# Patient Record
Sex: MALE | Race: WHITE | NOT HISPANIC OR LATINO | Employment: OTHER | URBAN - METROPOLITAN AREA
[De-identification: names, ages, dates, MRNs, and addresses within clinical notes are randomized per-mention and may not be internally consistent; named-entity substitution may affect disease eponyms.]

---

## 2023-11-03 PROBLEM — E78.5 HYPERLIPIDEMIA: Status: ACTIVE | Noted: 2023-11-03

## 2023-11-03 PROBLEM — G89.29 CHRONIC PAIN OF RIGHT KNEE: Status: ACTIVE | Noted: 2023-11-03

## 2023-11-03 PROBLEM — N39.0 RECURRENT UTI: Status: ACTIVE | Noted: 2023-11-03

## 2023-11-03 PROBLEM — H52.203 MYOPIA OF BOTH EYES WITH ASTIGMATISM AND PRESBYOPIA: Status: ACTIVE | Noted: 2023-11-03

## 2023-11-03 PROBLEM — H52.4 MYOPIA OF BOTH EYES WITH ASTIGMATISM AND PRESBYOPIA: Status: ACTIVE | Noted: 2023-11-03

## 2023-11-03 PROBLEM — H35.363 DEGENERATIVE RETINAL DRUSEN OF BOTH EYES: Status: ACTIVE | Noted: 2023-11-03

## 2023-11-03 PROBLEM — S62.609A BROKEN FINGER: Status: ACTIVE | Noted: 2023-11-03

## 2023-11-03 PROBLEM — R39.9 UTI SYMPTOMS: Status: ACTIVE | Noted: 2023-11-03

## 2023-11-03 PROBLEM — R41.3 MEMORY LOSS: Status: ACTIVE | Noted: 2023-11-03

## 2023-11-03 PROBLEM — R31.1 BENIGN ESSENTIAL MICROSCOPIC HEMATURIA: Status: ACTIVE | Noted: 2023-11-03

## 2023-11-03 PROBLEM — N13.8 BPH WITH OBSTRUCTION/LOWER URINARY TRACT SYMPTOMS: Status: ACTIVE | Noted: 2023-11-03

## 2023-11-03 PROBLEM — N40.1 BPH WITH OBSTRUCTION/LOWER URINARY TRACT SYMPTOMS: Status: ACTIVE | Noted: 2023-11-03

## 2023-11-03 PROBLEM — R47.02 DYSPHASIA: Status: ACTIVE | Noted: 2023-11-03

## 2023-11-03 PROBLEM — M25.561 CHRONIC PAIN OF RIGHT KNEE: Status: ACTIVE | Noted: 2023-11-03

## 2023-11-03 PROBLEM — R21 RASH: Status: ACTIVE | Noted: 2023-11-03

## 2023-11-03 PROBLEM — M72.0 DUPUYTREN'S CONTRACTURE: Status: ACTIVE | Noted: 2023-11-03

## 2023-11-03 PROBLEM — H52.13 MYOPIA OF BOTH EYES: Status: ACTIVE | Noted: 2023-11-03

## 2023-11-03 PROBLEM — S62.637B OPEN DISPLACED FRACTURE OF DISTAL PHALANX OF LEFT LITTLE FINGER: Status: ACTIVE | Noted: 2023-11-03

## 2023-11-03 PROBLEM — R31.9 HEMATURIA: Status: ACTIVE | Noted: 2023-11-03

## 2023-11-03 PROBLEM — H25.813 COMBINED FORM OF SENILE CATARACT OF BOTH EYES: Status: ACTIVE | Noted: 2023-11-03

## 2023-11-03 RX ORDER — ASPIRIN 325 MG
1 TABLET ORAL 2 TIMES DAILY
COMMUNITY
Start: 2014-01-03

## 2023-11-03 RX ORDER — TAMSULOSIN HYDROCHLORIDE 0.4 MG/1
0.4 CAPSULE ORAL NIGHTLY
COMMUNITY
End: 2023-11-06 | Stop reason: SDUPTHER

## 2023-11-03 RX ORDER — CLOBETASOL PROPIONATE 0.5 MG/G
OINTMENT TOPICAL 2 TIMES DAILY
COMMUNITY
Start: 2021-12-31

## 2023-11-03 RX ORDER — CLOTRIMAZOLE 1 %
CREAM (GRAM) TOPICAL 2 TIMES DAILY
COMMUNITY

## 2023-11-03 RX ORDER — ASCORBIC ACID 250 MG
250 TABLET,CHEWABLE ORAL
COMMUNITY

## 2023-11-03 RX ORDER — SULFAMETHOXAZOLE AND TRIMETHOPRIM 800; 160 MG/1; MG/1
1 TABLET ORAL EVERY 12 HOURS
COMMUNITY
Start: 2022-08-24

## 2023-11-03 RX ORDER — ASCORBIC ACID 500 MG
1 TABLET ORAL DAILY
COMMUNITY

## 2023-11-06 ENCOUNTER — OFFICE VISIT (OUTPATIENT)
Dept: UROLOGY | Facility: CLINIC | Age: 83
End: 2023-11-06
Payer: MEDICARE

## 2023-11-06 DIAGNOSIS — N40.1 BPH WITH OBSTRUCTION/LOWER URINARY TRACT SYMPTOMS: Primary | ICD-10-CM

## 2023-11-06 DIAGNOSIS — N39.0 RECURRENT UTI: ICD-10-CM

## 2023-11-06 DIAGNOSIS — N13.8 BPH WITH OBSTRUCTION/LOWER URINARY TRACT SYMPTOMS: Primary | ICD-10-CM

## 2023-11-06 LAB
POC APPEARANCE, URINE: CLEAR
POC BILIRUBIN, URINE: NEGATIVE
POC BLOOD, URINE: NEGATIVE
POC COLOR, URINE: YELLOW
POC GLUCOSE, URINE: NEGATIVE MG/DL
POC KETONES, URINE: ABNORMAL MG/DL
POC LEUKOCYTES, URINE: NEGATIVE
POC NITRITE,URINE: NEGATIVE
POC PH, URINE: 6 PH
POC PROTEIN, URINE: NEGATIVE MG/DL
POC SPECIFIC GRAVITY, URINE: 1.02
POC UROBILINOGEN, URINE: 0.2 EU/DL

## 2023-11-06 PROCEDURE — 81003 URINALYSIS AUTO W/O SCOPE: CPT | Performed by: UROLOGY

## 2023-11-06 PROCEDURE — 99214 OFFICE O/P EST MOD 30 MIN: CPT | Performed by: UROLOGY

## 2023-11-06 PROCEDURE — 51798 US URINE CAPACITY MEASURE: CPT | Performed by: UROLOGY

## 2023-11-06 PROCEDURE — 51741 ELECTRO-UROFLOWMETRY FIRST: CPT | Performed by: UROLOGY

## 2023-11-06 RX ORDER — TAMSULOSIN HYDROCHLORIDE 0.4 MG/1
0.4 CAPSULE ORAL NIGHTLY
Qty: 90 CAPSULE | Refills: 3 | Status: SHIPPED | OUTPATIENT
Start: 2023-11-06 | End: 2023-11-30 | Stop reason: SDUPTHER

## 2023-11-06 NOTE — PROGRESS NOTES
Subjective   John Green is a 83 y.o. male male with history of BPH on Tamsulosin and recurrent UTIs. He reports his LUTs have improved. He reports no recurrence of UTIs since starting Tamsulosin. He has occasional weak stream and incomplete bladder emptying which are not bothersome.  Denies any gross hematuria, flank pain, dysuria, fevers or chills.       Past Medical History:   Diagnosis Date    Chronic obstructive pulmonary disease, unspecified (CMS/HCC)     Chronic obstructive pulmonary disease    Diverticulosis of intestine, part unspecified, without perforation or abscess without bleeding     Diverticulosis    Other conditions influencing health status     Reported Prostate Antigen Blood Test    Other conditions influencing health status     Benign Polyps Of The Large Intestine    Personal history of other diseases of urinary system     History of bladder problems     No past surgical history on file.  Family History   Problem Relation Name Age of Onset    Heart failure Mother      Breast cancer Sister      Other (motor vehicle accident) Sister      Prostate cancer Brother      Colon cancer Maternal Grandmother      Colon cancer Paternal Grandfather          hx of lung cancer    Lung cancer Other       Current Outpatient Medications   Medication Sig Dispense Refill    ascorbic acid (Vitamin C) 250 MG chewable tablet Chew 1 tablet (250 mg).      ascorbic acid (Vitamin C) 500 mg tablet Take 1 tablet (500 mg) by mouth once daily.      aspirin 325 mg tablet Take 1 tablet (325 mg) by mouth 2 times a day.      clobetasol (Temovate) 0.05 % ointment Apply topically 2 times a day.      clotrimazole (Lotrimin) 1 % cream Apply topically 2 times a day.      docosahexaenoic acid/epa (FISH OIL ORAL) Take by mouth once daily.      sulfamethoxazole-trimethoprim (Bactrim DS) 800-160 mg tablet Take 1 tablet by mouth every 12 hours.      tamsulosin (Flomax) 0.4 mg 24 hr capsule Take 1 capsule (0.4 mg) by mouth once daily at  "bedtime. 90 capsule 3     No current facility-administered medications for this visit.     Allergies   Allergen Reactions    Penicillins Other     Social History     Socioeconomic History    Marital status: Single     Spouse name: Not on file    Number of children: Not on file    Years of education: Not on file    Highest education level: Not on file   Occupational History    Not on file   Tobacco Use    Smoking status: Not on file    Smokeless tobacco: Not on file   Substance and Sexual Activity    Alcohol use: Not on file    Drug use: Not on file    Sexual activity: Not on file   Other Topics Concern    Not on file   Social History Narrative    Not on file     Social Determinants of Health     Financial Resource Strain: Not on file   Food Insecurity: Not on file   Transportation Needs: Not on file   Physical Activity: Not on file   Stress: Not on file   Social Connections: Not on file   Intimate Partner Violence: Not on file   Housing Stability: Not on file       Review of Systems  Pertinent items are noted in HPI.    Objective     Lab Review  No results found for: \"WBC\", \"RBC\", \"HGB\", \"HCT\", \"PLT\"   Lab Results   Component Value Date    BUN 26 (H) 05/06/2020    CREATININE 0.96 05/06/2020      No results found for: \"PSA\"  Urine analysis shows negative  PVR is 55mL  Urolfow study demonstrated voided volume of 85 and maximal flow rate of 4.6 ml/s.   IPSS 13 and 2    Assessment/Plan   Diagnoses and all orders for this visit:  BPH with obstruction/lower urinary tract symptoms  -     POCT UA Automated manually resulted  -     Measure post void residual  -     Urine flow measurement  -     tamsulosin (Flomax) 0.4 mg 24 hr capsule; Take 1 capsule (0.4 mg) by mouth once daily at bedtime.  Recurrent UTI      BPH with LUTS     Patients urinary symptoms are well controlled on Tamsulosin.     We will refill Tamsulosin and follow up in 1 year.     2. Recurrent UTI    No recurrence since last year. Will continue to monitor. "       All questions were answered to the patient's satisfaction. Patient agrees with the plan and wishes to proceed. Follow-up will be scheduled appropriately.   Scribed for Dr. Amor by Shawnee Irizarry. I , Dr Amor, have personally reviewed and agreed with the information entered by the Virtual Scribe.     Shawnee Irizarry

## 2023-11-30 DIAGNOSIS — N40.1 BPH WITH OBSTRUCTION/LOWER URINARY TRACT SYMPTOMS: ICD-10-CM

## 2023-11-30 DIAGNOSIS — N13.8 BPH WITH OBSTRUCTION/LOWER URINARY TRACT SYMPTOMS: ICD-10-CM

## 2023-11-30 RX ORDER — TAMSULOSIN HYDROCHLORIDE 0.4 MG/1
0.4 CAPSULE ORAL NIGHTLY
Qty: 90 CAPSULE | Refills: 3 | Status: SHIPPED | OUTPATIENT
Start: 2023-11-30 | End: 2024-11-29

## 2023-11-30 NOTE — TELEPHONE ENCOUNTER
Refill was sent during his last appointment on 11/6/23. Patient states pharmacy does not have it.

## 2024-03-26 NOTE — PROGRESS NOTES
History of Present Illness    John Green is a 83 y.o. male who is seen self-referred because he has had a feeling in his throat on the right side.  He has the impression of a lump.  He denies any classic symptoms of gastroesophageal reflux disease.  He sometimes will have some voice changes.    Past Medical History    His past medical history is limited to prostate issues.  His medications are documented in the chart.  He does have allergy to penicillin.  He does not remember what happened.  He does not smoke.  He normally has 2 or 3 drinks a day.  He is here alone today.    Physical Exam    The patient is alert and oriented. Examination of the external ears, ear canals, and eardrums, is within normal limits. Examination of the anterior and external nose is negative. Examination of the oral cavity and oropharynx is normal. There is no evidence of any mucosal lesions. There is good mobility of the tongue and palate. There is good mandibular excursion. Palpation of the parotid, neck, and thyroid field is negative except for a palpable mass in the left neck at around level 3.  It is fairly soft.  It measures about 3 cm or so.    A flexible laryngoscopy was carried out. Under topical Xylocaine and Diogenes-Synephrine the scope was introduced through the nostril. The nasopharynx, base of tongue, hypopharynx, and larynx are visualized.  The vocal cords are normally mobile. There is no pooling of secretions in the piriform sinuses. There is no evidence of any mucosal lesions.    Assessment and Plan    Throat sensation which may well be due to gastroesophageal reflux disease.  I gave him a list of instructions for dietary modifications.    Left neck mass which is fairly soft but at the same time should not be there.  A CT scan of the neck will be obtained.    I will see him after the CT scan.

## 2024-03-27 ENCOUNTER — LAB (OUTPATIENT)
Dept: LAB | Facility: LAB | Age: 84
End: 2024-03-27
Payer: MEDICARE

## 2024-03-27 ENCOUNTER — OFFICE VISIT (OUTPATIENT)
Dept: OTOLARYNGOLOGY | Facility: CLINIC | Age: 84
End: 2024-03-27
Payer: MEDICARE

## 2024-03-27 VITALS — TEMPERATURE: 93 F | BODY MASS INDEX: 23.5 KG/M2 | WEIGHT: 163.8 LBS

## 2024-03-27 DIAGNOSIS — K21.9 REFLUX LARYNGITIS: Primary | ICD-10-CM

## 2024-03-27 DIAGNOSIS — R22.1 MASS OF LEFT SIDE OF NECK: ICD-10-CM

## 2024-03-27 DIAGNOSIS — J04.0 REFLUX LARYNGITIS: Primary | ICD-10-CM

## 2024-03-27 LAB
CREAT SERPL-MCNC: 1.08 MG/DL (ref 0.5–1.3)
EGFRCR SERPLBLD CKD-EPI 2021: 68 ML/MIN/1.73M*2

## 2024-03-27 PROCEDURE — 1159F MED LIST DOCD IN RCRD: CPT | Performed by: OTOLARYNGOLOGY

## 2024-03-27 PROCEDURE — 99203 OFFICE O/P NEW LOW 30 MIN: CPT | Performed by: OTOLARYNGOLOGY

## 2024-03-27 PROCEDURE — 31575 DIAGNOSTIC LARYNGOSCOPY: CPT | Performed by: OTOLARYNGOLOGY

## 2024-03-27 PROCEDURE — 82565 ASSAY OF CREATININE: CPT

## 2024-03-27 PROCEDURE — 1160F RVW MEDS BY RX/DR IN RCRD: CPT | Performed by: OTOLARYNGOLOGY

## 2024-03-27 PROCEDURE — 36415 COLL VENOUS BLD VENIPUNCTURE: CPT

## 2024-03-27 ASSESSMENT — ENCOUNTER SYMPTOMS: DEPRESSION: 0

## 2024-04-02 NOTE — PROGRESS NOTES
History of Present Illness    John Green was seen in March 2024 self-referred because he has had a feeling in his throat on the right side.  He has the impression of a lump.  He denies any classic symptoms of gastroesophageal reflux disease.  He sometimes will have some voice changes.  I felt a mass in his left mid neck and sent him for CT scanning which was done in April 2024.  I personally reviewed that scan and cannot appreciate any masses.  He does have some rotation of his larynx which may be the cause of the palpable mass.    Physical Exam    Palpation of the parotid, neck, and thyroid field is negative except for this asymmetry with the left side being more prominent at the laryngeal level.    Assessment and Plan    Throat sensation which may well be due to gastroesophageal reflux disease.  I gave him a list of instructions for dietary modifications.    Left neck asymmetry which seems to be related to some rotation of the larynx.  I cannot appreciate any worrisome lesions on the scan.  If the radiologist disagrees with me I will call the patient back.    I will see him in 3 months.

## 2024-04-03 ENCOUNTER — HOSPITAL ENCOUNTER (OUTPATIENT)
Dept: RADIOLOGY | Facility: CLINIC | Age: 84
Discharge: HOME | End: 2024-04-03
Payer: MEDICARE

## 2024-04-03 ENCOUNTER — OFFICE VISIT (OUTPATIENT)
Dept: OTOLARYNGOLOGY | Facility: CLINIC | Age: 84
End: 2024-04-03
Payer: MEDICARE

## 2024-04-03 VITALS — HEIGHT: 70 IN | BODY MASS INDEX: 23.05 KG/M2 | WEIGHT: 161 LBS | TEMPERATURE: 97.5 F

## 2024-04-03 DIAGNOSIS — R22.1 MASS OF LEFT SIDE OF NECK: Primary | ICD-10-CM

## 2024-04-03 DIAGNOSIS — R22.1 MASS OF LEFT SIDE OF NECK: ICD-10-CM

## 2024-04-03 PROCEDURE — 1160F RVW MEDS BY RX/DR IN RCRD: CPT | Performed by: OTOLARYNGOLOGY

## 2024-04-03 PROCEDURE — 1159F MED LIST DOCD IN RCRD: CPT | Performed by: OTOLARYNGOLOGY

## 2024-04-03 PROCEDURE — 70491 CT SOFT TISSUE NECK W/DYE: CPT | Performed by: RADIOLOGY

## 2024-04-03 PROCEDURE — 1036F TOBACCO NON-USER: CPT | Performed by: OTOLARYNGOLOGY

## 2024-04-03 PROCEDURE — 2550000001 HC RX 255 CONTRASTS: Performed by: OTOLARYNGOLOGY

## 2024-04-03 PROCEDURE — 99213 OFFICE O/P EST LOW 20 MIN: CPT | Performed by: OTOLARYNGOLOGY

## 2024-04-03 PROCEDURE — 70491 CT SOFT TISSUE NECK W/DYE: CPT

## 2024-04-03 RX ADMIN — IOHEXOL 75 ML: 350 INJECTION, SOLUTION INTRAVENOUS at 13:20

## 2024-04-03 ASSESSMENT — PATIENT HEALTH QUESTIONNAIRE - PHQ9
2. FEELING DOWN, DEPRESSED OR HOPELESS: NOT AT ALL
1. LITTLE INTEREST OR PLEASURE IN DOING THINGS: NOT AT ALL
SUM OF ALL RESPONSES TO PHQ9 QUESTIONS 1 AND 2: 0

## 2024-07-01 NOTE — PROGRESS NOTES
History of Present Illness    John Green was seen in March 2024 self-referred because he has had a feeling in his throat on the right side.  He has the impression of a lump.  He denies any classic symptoms of gastroesophageal reflux disease.  He sometimes will have some voice changes.  I felt a mass in his left mid neck and sent him for CT scanning which was done in April 2024.  I personally reviewed that scan and cannot appreciate any masses.  His CT scan was also negative.  I felt that his symptoms were related to reflux but it does not seem that the patient changed his diet in any way.  He continues with the same symptoms.    Physical Exam    Palpation of the parotid, neck, and thyroid field is negative except for this asymmetry with the left side being more prominent at the laryngeal level.    A flexible laryngoscopy was carried out. Under topical Xylocaine and Diogenes-Synephrine the scope was introduced through the nostril. The nasopharynx, base of tongue, hypopharynx, and larynx are visualized.  The vocal cords are normally mobile. There is no pooling of secretions in the piriform sinuses. There is no evidence of any mucosal lesions.    Assessment and Plan    Throat sensation which may well be due to gastroesophageal reflux disease.  I I reinforced the need for dietary modifications.  I also will put him on omeprazole.  He was instructed to take it half an hour before breakfast.    I will see him in 3 months.

## 2024-07-03 ENCOUNTER — APPOINTMENT (OUTPATIENT)
Dept: OTOLARYNGOLOGY | Facility: CLINIC | Age: 84
End: 2024-07-03
Payer: MEDICARE

## 2024-07-03 VITALS — HEIGHT: 70 IN | WEIGHT: 163 LBS | BODY MASS INDEX: 23.34 KG/M2 | TEMPERATURE: 98 F

## 2024-07-03 DIAGNOSIS — J04.0 REFLUX LARYNGITIS: Primary | ICD-10-CM

## 2024-07-03 DIAGNOSIS — K21.9 REFLUX LARYNGITIS: Primary | ICD-10-CM

## 2024-07-03 PROCEDURE — 1159F MED LIST DOCD IN RCRD: CPT | Performed by: OTOLARYNGOLOGY

## 2024-07-03 PROCEDURE — 31575 DIAGNOSTIC LARYNGOSCOPY: CPT | Performed by: OTOLARYNGOLOGY

## 2024-07-03 PROCEDURE — 1160F RVW MEDS BY RX/DR IN RCRD: CPT | Performed by: OTOLARYNGOLOGY

## 2024-07-03 PROCEDURE — 99213 OFFICE O/P EST LOW 20 MIN: CPT | Performed by: OTOLARYNGOLOGY

## 2024-07-03 PROCEDURE — 1036F TOBACCO NON-USER: CPT | Performed by: OTOLARYNGOLOGY

## 2024-07-03 ASSESSMENT — ENCOUNTER SYMPTOMS
LOSS OF SENSATION IN FEET: 0
OCCASIONAL FEELINGS OF UNSTEADINESS: 0
DEPRESSION: 0

## 2024-07-03 ASSESSMENT — PATIENT HEALTH QUESTIONNAIRE - PHQ9: 1. LITTLE INTEREST OR PLEASURE IN DOING THINGS: NOT AT ALL

## 2024-07-12 ENCOUNTER — APPOINTMENT (OUTPATIENT)
Dept: OPHTHALMOLOGY | Facility: CLINIC | Age: 84
End: 2024-07-12
Payer: MEDICARE

## 2024-08-26 ENCOUNTER — APPOINTMENT (OUTPATIENT)
Dept: OPHTHALMOLOGY | Facility: CLINIC | Age: 84
End: 2024-08-26
Payer: MEDICARE

## 2024-10-15 NOTE — PROGRESS NOTES
History of Present Illness    John Green was seen in March 2024 self-referred because he has had a feeling in his throat on the right side.  He has the impression of a lump.  He denies any classic symptoms of gastroesophageal reflux disease.  He sometimes will have some voice changes.  I felt a mass in his left mid neck and sent him for CT scanning which was done in April 2024.  I personally reviewed that scan and cannot appreciate any masses.  His CT scan was also negative.  I felt that his symptoms were related to reflux but it does not seem that the patient changed his diet in any way.  He continues with the same symptoms.    Physical Exam    Palpation of the parotid, neck, and thyroid field is negative except for this asymmetry with the left side being more prominent at the laryngeal level.    A flexible laryngoscopy was carried out. Under topical Xylocaine and Diogenes-Synephrine the scope was introduced through the nostril. The nasopharynx, base of tongue, hypopharynx, and larynx are visualized.  The vocal cords are normally mobile. There is no pooling of secretions in the piriform sinuses.  It is somewhat irritated.  There is no evidence of any mucosal lesions.    Assessment and Plan    Throat sensation which may well be due to gastroesophageal reflux disease.  I I reinforced the need for dietary modifications.  I also will put him on omeprazole.  He was instructed to take it half an hour before breakfast.    Left persistent neck mass.  There is really nothing that was found on scanning.    I will see him in 3 months.

## 2024-10-16 ENCOUNTER — APPOINTMENT (OUTPATIENT)
Dept: OTOLARYNGOLOGY | Facility: CLINIC | Age: 84
End: 2024-10-16
Payer: MEDICARE

## 2024-10-16 VITALS — TEMPERATURE: 97.5 F | HEIGHT: 69 IN | WEIGHT: 162.1 LBS | BODY MASS INDEX: 24.01 KG/M2

## 2024-10-16 DIAGNOSIS — J04.0 REFLUX LARYNGITIS: Primary | ICD-10-CM

## 2024-10-16 DIAGNOSIS — R22.1 MASS OF LEFT SIDE OF NECK: ICD-10-CM

## 2024-10-16 DIAGNOSIS — K21.9 REFLUX LARYNGITIS: Primary | ICD-10-CM

## 2024-10-16 RX ORDER — OMEPRAZOLE 40 MG/1
40 CAPSULE, DELAYED RELEASE ORAL
Qty: 90 CAPSULE | Refills: 1 | Status: SHIPPED | OUTPATIENT
Start: 2024-10-16 | End: 2025-04-14

## 2024-10-16 ASSESSMENT — PATIENT HEALTH QUESTIONNAIRE - PHQ9
SUM OF ALL RESPONSES TO PHQ9 QUESTIONS 1 AND 2: 0
1. LITTLE INTEREST OR PLEASURE IN DOING THINGS: NOT AT ALL
2. FEELING DOWN, DEPRESSED OR HOPELESS: NOT AT ALL

## 2024-10-16 ASSESSMENT — PAIN SCALES - GENERAL: PAINLEVEL_OUTOF10: 0-NO PAIN

## 2024-11-06 ENCOUNTER — APPOINTMENT (OUTPATIENT)
Dept: UROLOGY | Facility: CLINIC | Age: 84
End: 2024-11-06
Payer: MEDICARE

## 2024-11-06 VITALS — TEMPERATURE: 97 F | HEIGHT: 69 IN | BODY MASS INDEX: 23.7 KG/M2 | WEIGHT: 160 LBS

## 2024-11-06 DIAGNOSIS — N13.8 BPH WITH OBSTRUCTION/LOWER URINARY TRACT SYMPTOMS: Primary | ICD-10-CM

## 2024-11-06 DIAGNOSIS — N40.1 BPH WITH OBSTRUCTION/LOWER URINARY TRACT SYMPTOMS: Primary | ICD-10-CM

## 2024-11-06 PROCEDURE — 99213 OFFICE O/P EST LOW 20 MIN: CPT | Performed by: UROLOGY

## 2024-11-06 PROCEDURE — 51741 ELECTRO-UROFLOWMETRY FIRST: CPT | Performed by: UROLOGY

## 2024-11-06 PROCEDURE — 1126F AMNT PAIN NOTED NONE PRSNT: CPT | Performed by: UROLOGY

## 2024-11-06 PROCEDURE — 51798 US URINE CAPACITY MEASURE: CPT | Performed by: UROLOGY

## 2024-11-06 PROCEDURE — 1159F MED LIST DOCD IN RCRD: CPT | Performed by: UROLOGY

## 2024-11-06 RX ORDER — TAMSULOSIN HYDROCHLORIDE 0.4 MG/1
0.4 CAPSULE ORAL NIGHTLY
Qty: 90 CAPSULE | Refills: 3 | Status: SHIPPED | OUTPATIENT
Start: 2024-11-06 | End: 2025-11-06

## 2024-11-06 ASSESSMENT — PAIN SCALES - GENERAL: PAINLEVEL_OUTOF10: 0-NO PAIN

## 2024-11-06 NOTE — PROGRESS NOTES
Subjective   John Green is a 84 y.o. male with history of BPH on Tamsulosin and recurrent UTIs; presenting today for annual follow up visit.     No recent UTIS.He reports his LUTs have improved. He reports no recurrence of UTIs since starting Tamsulosin. He has occasional weak stream and incomplete bladder emptying which are not bothersome.  Denies any gross hematuria, flank pain, dysuria, fevers or chills.       PVR 76  Uroflow study demonstrated voided volume of 79 and maximal flow rate of 3 ml/s.       International Prostate Symptom Score (I-PSS)   I-PSS Total Score: 14  Quality of Life Score: 2  Past Medical History:   Diagnosis Date    Chronic obstructive pulmonary disease, unspecified     Chronic obstructive pulmonary disease    Diverticulosis of intestine, part unspecified, without perforation or abscess without bleeding     Diverticulosis    Other conditions influencing health status     Reported Prostate Antigen Blood Test    Other conditions influencing health status     Benign Polyps Of The Large Intestine    Personal history of other diseases of urinary system     History of bladder problems     No past surgical history on file.  Family History   Problem Relation Name Age of Onset    Heart failure Mother      Breast cancer Sister      Other (motor vehicle accident) Sister      Prostate cancer Brother      Colon cancer Maternal Grandmother      Colon cancer Paternal Grandfather          hx of lung cancer    Lung cancer Other       Current Outpatient Medications   Medication Sig Dispense Refill    ascorbic acid (Vitamin C) 250 MG chewable tablet Chew 1 tablet (250 mg).      ascorbic acid (Vitamin C) 500 mg tablet Take 1 tablet (500 mg) by mouth once daily.      aspirin 325 mg tablet Take 1 tablet (325 mg) by mouth 2 times a day.      clobetasol (Temovate) 0.05 % ointment Apply topically 2 times a day.      clotrimazole (Lotrimin) 1 % cream Apply topically 2 times a day.      docosahexaenoic acid/epa  "(FISH OIL ORAL) Take by mouth once daily.      omeprazole (PriLOSEC) 40 mg DR capsule Take 1 capsule (40 mg) by mouth once daily in the morning. Take before meals. Do not crush or chew. 90 capsule 1    sulfamethoxazole-trimethoprim (Bactrim DS) 800-160 mg tablet Take 1 tablet by mouth every 12 hours.      tamsulosin (Flomax) 0.4 mg 24 hr capsule Take 1 capsule (0.4 mg) by mouth once daily at bedtime. 90 capsule 3     No current facility-administered medications for this visit.     Allergies   Allergen Reactions    Penicillins Other     Social History     Socioeconomic History    Marital status: Single     Spouse name: Not on file    Number of children: Not on file    Years of education: Not on file    Highest education level: Not on file   Occupational History    Not on file   Tobacco Use    Smoking status: Never    Smokeless tobacco: Never   Substance and Sexual Activity    Alcohol use: Never    Drug use: Not on file    Sexual activity: Not on file   Other Topics Concern    Not on file   Social History Narrative    Not on file     Social Drivers of Health     Financial Resource Strain: Not on file   Food Insecurity: Not on file   Transportation Needs: Not on file   Physical Activity: Not on file   Stress: Not on file   Social Connections: Not on file   Intimate Partner Violence: Not on file   Housing Stability: Not on file       Review of Systems  Pertinent items are noted in HPI.    Objective       Lab Review  No results found for: \"WBC\", \"RBC\", \"HGB\", \"HCT\", \"PLT\"   Lab Results   Component Value Date    BUN 26 (H) 05/06/2020    CREATININE 1.08 03/27/2024      PVR 76  Uroflow study demonstrated voided volume of 79 and maximal flow rate of 3 ml/s.       International Prostate Symptom Score (I-PSS)   I-PSS Total Score: 14  Quality of Life Score: 2      Assessment/Plan   There are no diagnoses linked to this encounter.    BPH with LUTS     Patients urinary symptoms are well controlled on Tamsulosin.    We will refill " Tamsulosin and follow up in 1 year.      2. Recurrent UTI   No recurrence since last year. Will continue to monitor.     Follow up annually with NP.    E&M visit today is associated with current or anticipated ongoing medical care services related to a patient's single, serious condition or a complex condition.    All questions were answered to the patient's satisfaction. Patient agrees with the plan and wishes to proceed. Follow-up will be scheduled appropriately.     Scribed for Dr. Amor by Prachi Fine . I , Dr Amor, have personally reviewed and agreed with the information entered by the Virtual Scribe.

## 2024-11-12 ENCOUNTER — APPOINTMENT (OUTPATIENT)
Dept: OPHTHALMOLOGY | Facility: CLINIC | Age: 84
End: 2024-11-12
Payer: MEDICARE

## 2024-11-12 DIAGNOSIS — H52.4 PRESBYOPIA: ICD-10-CM

## 2024-11-12 DIAGNOSIS — H35.363 DEGENERATIVE RETINAL DRUSEN OF BOTH EYES: Primary | ICD-10-CM

## 2024-11-12 DIAGNOSIS — H52.13 MYOPIA OF BOTH EYES: ICD-10-CM

## 2024-11-12 DIAGNOSIS — H52.223 REGULAR ASTIGMATISM, BILATERAL: ICD-10-CM

## 2024-11-12 DIAGNOSIS — H25.813 COMBINED FORMS OF AGE-RELATED CATARACT, BILATERAL: ICD-10-CM

## 2024-11-12 PROCEDURE — 1036F TOBACCO NON-USER: CPT | Performed by: OPHTHALMOLOGY

## 2024-11-12 PROCEDURE — 92015 DETERMINE REFRACTIVE STATE: CPT | Performed by: OPHTHALMOLOGY

## 2024-11-12 PROCEDURE — 92134 CPTRZ OPH DX IMG PST SGM RTA: CPT | Performed by: OPHTHALMOLOGY

## 2024-11-12 PROCEDURE — 92004 COMPRE OPH EXAM NEW PT 1/>: CPT | Performed by: OPHTHALMOLOGY

## 2024-11-12 PROCEDURE — 1159F MED LIST DOCD IN RCRD: CPT | Performed by: OPHTHALMOLOGY

## 2024-11-12 ASSESSMENT — REFRACTION_WEARINGRX
OS_SPHERE: -1.25
SPECS_TYPE: BIFOCAL
OD_ADD: +2.25
OD_AXIS: 115
OD_CYLINDER: -0.75
OS_ADD: +2.25
OS_AXIS: 090
OS_CYLINDER: -0.75
OD_SPHERE: -2.25

## 2024-11-12 ASSESSMENT — EXTERNAL EXAM - LEFT EYE: OS_EXAM: NORMAL

## 2024-11-12 ASSESSMENT — EXTERNAL EXAM - RIGHT EYE: OD_EXAM: NORMAL

## 2024-11-12 ASSESSMENT — REFRACTION_MANIFEST
OS_SPHERE: -2.75
OD_AXIS: 095
OS_AXIS: 075
OD_SPHERE: -1.50
OD_CYLINDER: -3.00
OS_ADD: +2.50
OS_CYLINDER: -1.00
OD_ADD: +2.50

## 2024-11-12 ASSESSMENT — ENCOUNTER SYMPTOMS
PSYCHIATRIC NEGATIVE: 0
NEUROLOGICAL NEGATIVE: 0
CARDIOVASCULAR NEGATIVE: 0
CONSTITUTIONAL NEGATIVE: 0
RESPIRATORY NEGATIVE: 0
GASTROINTESTINAL NEGATIVE: 0
MUSCULOSKELETAL NEGATIVE: 0
EYES NEGATIVE: 0
ENDOCRINE NEGATIVE: 0
HEMATOLOGIC/LYMPHATIC NEGATIVE: 0
ALLERGIC/IMMUNOLOGIC NEGATIVE: 0

## 2024-11-12 ASSESSMENT — VISUAL ACUITY
OD_CC: 20/60
OS_PH_CC+: -1
OD_BAT_MED: 20/50
OS_BAT_MED: 20/80
OD_PH_CC: 20/40
OS_CC+: -1
METHOD: SNELLEN - LINEAR
CORRECTION_TYPE: GLASSES
OS_CC: 20/60
OS_PH_CC: 20/40

## 2024-11-12 ASSESSMENT — SLIT LAMP EXAM - LIDS
COMMENTS: GOOD POSITION, 2+ MGD
COMMENTS: GOOD POSITION, 2+ MGD

## 2024-11-12 ASSESSMENT — TONOMETRY
OD_IOP_MMHG: 14
OS_IOP_MMHG: 14
IOP_METHOD: GOLDMANN APPLANATION

## 2024-11-12 ASSESSMENT — CUP TO DISC RATIO
OD_RATIO: .4
OS_RATIO: .4

## 2024-11-12 NOTE — PROGRESS NOTES
Assessment/Plan   Diagnoses and all orders for this visit:  Degenerative retinal drusen of both eyes  -     OCT, Retina - OU - Both Eyes  Combined forms of age-related cataract, bilateral  Cataract not visually significant at this time. Discussed cataract surgery indications,20/50 or worse, glare dropping vision 2 lines or more and affecting activities of daily living  Observe for progression   On tamsulosion  Myopia of both eyes  Presbyopia  Regular astigmatism, bilateral  Glasses prescription given to patient today   I discussed the results of the exam and testing done today with the patient.Expressed understanding and all questions answered

## 2024-12-19 DIAGNOSIS — N40.1 BPH WITH OBSTRUCTION/LOWER URINARY TRACT SYMPTOMS: ICD-10-CM

## 2024-12-19 DIAGNOSIS — N13.8 BPH WITH OBSTRUCTION/LOWER URINARY TRACT SYMPTOMS: ICD-10-CM

## 2024-12-20 RX ORDER — TAMSULOSIN HYDROCHLORIDE 0.4 MG/1
0.4 CAPSULE ORAL NIGHTLY
Qty: 90 CAPSULE | Refills: 3 | Status: SHIPPED | OUTPATIENT
Start: 2024-12-20 | End: 2025-12-20

## 2025-01-28 NOTE — PROGRESS NOTES
History of Present Illness    John Green was seen in March 2024 self-referred because he has had a feeling in his throat on the right side.  He has the impression of a lump.  He denied any classic symptoms of gastroesophageal reflux disease.  He sometimes will have some voice changes.  I felt a mass in his left mid neck and sent him for CT scanning which was done in April 2024.  I personally reviewed that scan and could not appreciate any masses.  His CT scan was also negative.  I felt that his symptoms were related to reflux and eventually put him on omeprazole.  He does not describe any persistent throat discomfort.  He does have the occasional difficulty in swallowing his pill especially the omeprazole first thing in the morning.    Physical Exam    Examination of the oral cavity and oropharynx is within normal limits.  Palpation of the parotid, neck, and thyroid field is negative except for this asymmetry with the left side being more prominent at the laryngeal level.    A flexible laryngoscopy was carried out. Under topical Xylocaine and Diogenes-Synephrine the scope was introduced through the nostril. The nasopharynx, base of tongue, hypopharynx, and larynx are visualized.  The vocal cords are normally mobile. There is no pooling of secretions in the piriform sinuses.  I do not see any residual irritation.  There is no evidence of any mucosal lesions.    Assessment and Plan    Improvement in the throat discomfort most likely from reflux.  The patient is to continue the same regimen.    Left persistent neck mass.  It is more so in asymmetry.  There is really nothing that was found on scanning.    I will see him in 6 months.

## 2025-01-29 ENCOUNTER — APPOINTMENT (OUTPATIENT)
Dept: OTOLARYNGOLOGY | Facility: CLINIC | Age: 85
End: 2025-01-29
Payer: COMMERCIAL

## 2025-01-29 VITALS
DIASTOLIC BLOOD PRESSURE: 88 MMHG | BODY MASS INDEX: 24.29 KG/M2 | WEIGHT: 164 LBS | SYSTOLIC BLOOD PRESSURE: 155 MMHG | HEIGHT: 69 IN

## 2025-01-29 DIAGNOSIS — K21.9 REFLUX LARYNGITIS: Primary | ICD-10-CM

## 2025-01-29 DIAGNOSIS — J04.0 REFLUX LARYNGITIS: Primary | ICD-10-CM

## 2025-01-29 DIAGNOSIS — R22.1 MASS OF LEFT SIDE OF NECK: ICD-10-CM

## 2025-01-29 PROCEDURE — 31575 DIAGNOSTIC LARYNGOSCOPY: CPT | Performed by: OTOLARYNGOLOGY

## 2025-01-29 PROCEDURE — 1160F RVW MEDS BY RX/DR IN RCRD: CPT | Performed by: OTOLARYNGOLOGY

## 2025-01-29 PROCEDURE — 1036F TOBACCO NON-USER: CPT | Performed by: OTOLARYNGOLOGY

## 2025-01-29 PROCEDURE — 99213 OFFICE O/P EST LOW 20 MIN: CPT | Performed by: OTOLARYNGOLOGY

## 2025-01-29 PROCEDURE — 1159F MED LIST DOCD IN RCRD: CPT | Performed by: OTOLARYNGOLOGY

## 2025-03-19 ENCOUNTER — APPOINTMENT (OUTPATIENT)
Dept: PRIMARY CARE | Facility: CLINIC | Age: 85
End: 2025-03-19
Payer: MEDICARE

## 2025-03-19 VITALS
SYSTOLIC BLOOD PRESSURE: 129 MMHG | OXYGEN SATURATION: 97 % | DIASTOLIC BLOOD PRESSURE: 75 MMHG | HEART RATE: 54 BPM | HEIGHT: 69 IN | WEIGHT: 161 LBS | BODY MASS INDEX: 23.85 KG/M2

## 2025-03-19 DIAGNOSIS — Z12.5 ENCOUNTER FOR SCREENING PROSTATE SPECIFIC ANTIGEN (PSA) MEASUREMENT: ICD-10-CM

## 2025-03-19 DIAGNOSIS — Z23 NEED FOR PROPHYLACTIC VACCINATION AGAINST STREPTOCOCCUS PNEUMONIAE (PNEUMOCOCCUS): ICD-10-CM

## 2025-03-19 DIAGNOSIS — I10 HYPERTENSION, UNSPECIFIED TYPE: ICD-10-CM

## 2025-03-19 DIAGNOSIS — M12.812 ROTATOR CUFF ARTHROPATHY OF LEFT SHOULDER: ICD-10-CM

## 2025-03-19 DIAGNOSIS — Z00.00 HEALTH CARE MAINTENANCE: Primary | ICD-10-CM

## 2025-03-19 PROCEDURE — 1036F TOBACCO NON-USER: CPT | Performed by: INTERNAL MEDICINE

## 2025-03-19 PROCEDURE — G2211 COMPLEX E/M VISIT ADD ON: HCPCS | Performed by: INTERNAL MEDICINE

## 2025-03-19 PROCEDURE — 90677 PCV20 VACCINE IM: CPT | Performed by: INTERNAL MEDICINE

## 2025-03-19 PROCEDURE — 99213 OFFICE O/P EST LOW 20 MIN: CPT | Performed by: INTERNAL MEDICINE

## 2025-03-19 PROCEDURE — 3078F DIAST BP <80 MM HG: CPT | Performed by: INTERNAL MEDICINE

## 2025-03-19 PROCEDURE — G0009 ADMIN PNEUMOCOCCAL VACCINE: HCPCS | Performed by: INTERNAL MEDICINE

## 2025-03-19 PROCEDURE — 3074F SYST BP LT 130 MM HG: CPT | Performed by: INTERNAL MEDICINE

## 2025-03-19 PROCEDURE — 1159F MED LIST DOCD IN RCRD: CPT | Performed by: INTERNAL MEDICINE

## 2025-03-19 PROCEDURE — 1160F RVW MEDS BY RX/DR IN RCRD: CPT | Performed by: INTERNAL MEDICINE

## 2025-03-19 NOTE — PROGRESS NOTES
"Subjective   Patient ID: John Green is a 84 y.o. male who presents for [facility].    HPI     Patient is an 84-year-old male with past medical history of laryngeal reflux and BPH presents to [facility].  Patient has not seen a primary care doctor in greater than 5 years.  No recent laboratory test send no complaints at this time.  Patient lives at home alone.  His siblings have passed.  He has no functional limitations at this time.  Takes his medications as prescribed.    He does complain of difficulty raising his shoulder above 90 degrees.  Present for several months.  No trauma or injury.  Is never been evaluated by orthopedist or physical therapy.    Review of Systems  Constitutional: No fever or chills  Cardiovascular: no chest pain, no palpitations and no syncope.   Respiratory: no cough, no shortness of breath during exertion and no shortness of breath at rest.   Gastrointestinal: no abdominal pain, no nausea and no vomiting.  Neuro: No Headache, no dizziness    Objective   /75   Pulse 54   Ht 1.753 m (5' 9\")   Wt 73 kg (161 lb)   SpO2 97%   BMI 23.78 kg/m²     Physical Exam  Constitutional: Alert and in no acute distress. Well developed, well nourished  Head and Face: Head and face: Normal.    Cardiovascular: Heart rate and rhythm were normal, normal S1 and S2. No peripheral edema.   Pulmonary: No respiratory distress. Clear bilateral breath sounds.  Musculoskeletal: Gait and station: Normal. Muscle strength/tone: Normal.   Skin: Normal skin color and pigmentation, normal skin turgor, and no rash.    Psychiatric: Judgment and insight: Intact. Mood and affect: Normal.    Procedures    Lab Results   Component Value Date     05/06/2020    K 4.0 05/06/2020     05/06/2020    CREATININE 1.08 03/27/2024    BUN 26 (H) 05/06/2020    CO2 28 05/06/2020       OCT, Retina - OU - Both Eyes  Retinal multimodal imaging including photography was completed, and the   findings are described in " the examination.  drea            Assessment/Plan   Problem List Items Addressed This Visit    None  Visit Diagnoses         Codes    Health care maintenance    -  Primary Z00.00    Relevant Orders    CBC    Comprehensive metabolic panel    Lipid Panel    TSH with reflex to Free T4 if abnormal    Prostate Spec.Ag,Screen    Encounter for screening prostate specific antigen (PSA) measurement     Z12.5    Relevant Orders    Prostate Spec.Ag,Screen    Hypertension, unspecified type     I10    Relevant Orders    CBC    Rotator cuff arthropathy of left shoulder     M12.812    Relevant Orders    Referral to Physical Therapy    Need for prophylactic vaccination against Streptococcus pneumoniae (pneumococcus)     Z23    Relevant Orders    Pneumococcal conjugate vaccine, 20-valent (PREVNAR 20) (Completed)            Check screening labs.  Referral to physical therapy for rotator cuff arthropathy.  Follow-up if no improvement in 2 to 3 months.  Continue following with urology for BPH.  Continue omeprazole for LPR.  Follow-up for wellness exam

## 2025-03-20 LAB
ALBUMIN SERPL-MCNC: 4.7 G/DL (ref 3.6–5.1)
ALP SERPL-CCNC: 52 U/L (ref 35–144)
ALT SERPL-CCNC: 16 U/L (ref 9–46)
ANION GAP SERPL CALCULATED.4IONS-SCNC: 9 MMOL/L (CALC) (ref 7–17)
AST SERPL-CCNC: 28 U/L (ref 10–35)
BILIRUB SERPL-MCNC: 0.6 MG/DL (ref 0.2–1.2)
BUN SERPL-MCNC: 27 MG/DL (ref 7–25)
CALCIUM SERPL-MCNC: 9.7 MG/DL (ref 8.6–10.3)
CHLORIDE SERPL-SCNC: 102 MMOL/L (ref 98–110)
CHOLEST SERPL-MCNC: 212 MG/DL
CHOLEST/HDLC SERPL: 2.7 (CALC)
CO2 SERPL-SCNC: 26 MMOL/L (ref 20–32)
CREAT SERPL-MCNC: 1 MG/DL (ref 0.7–1.22)
EGFRCR SERPLBLD CKD-EPI 2021: 74 ML/MIN/1.73M2
ERYTHROCYTE [DISTWIDTH] IN BLOOD BY AUTOMATED COUNT: 13.3 % (ref 11–15)
GLUCOSE SERPL-MCNC: 92 MG/DL (ref 65–139)
HCT VFR BLD AUTO: 42.2 % (ref 38.5–50)
HDLC SERPL-MCNC: 78 MG/DL
HGB BLD-MCNC: 14 G/DL (ref 13.2–17.1)
LDLC SERPL CALC-MCNC: 116 MG/DL (CALC)
MCH RBC QN AUTO: 33.3 PG (ref 27–33)
MCHC RBC AUTO-ENTMCNC: 33.2 G/DL (ref 32–36)
MCV RBC AUTO: 100.5 FL (ref 80–100)
NONHDLC SERPL-MCNC: 134 MG/DL (CALC)
PLATELET # BLD AUTO: 157 THOUSAND/UL (ref 140–400)
PMV BLD REES-ECKER: 10.5 FL (ref 7.5–12.5)
POTASSIUM SERPL-SCNC: 4.3 MMOL/L (ref 3.5–5.3)
PROT SERPL-MCNC: 7.1 G/DL (ref 6.1–8.1)
PSA SERPL-MCNC: 4.95 NG/ML
RBC # BLD AUTO: 4.2 MILLION/UL (ref 4.2–5.8)
SODIUM SERPL-SCNC: 137 MMOL/L (ref 135–146)
TRIGL SERPL-MCNC: 79 MG/DL
TSH SERPL-ACNC: 4.43 MIU/L (ref 0.4–4.5)
WBC # BLD AUTO: 6.3 THOUSAND/UL (ref 3.8–10.8)

## 2025-04-14 ENCOUNTER — TELEPHONE (OUTPATIENT)
Dept: OTOLARYNGOLOGY | Facility: HOSPITAL | Age: 85
End: 2025-04-14
Payer: MEDICARE

## 2025-04-14 DIAGNOSIS — J04.0 REFLUX LARYNGITIS: ICD-10-CM

## 2025-04-14 DIAGNOSIS — K21.9 REFLUX LARYNGITIS: ICD-10-CM

## 2025-04-14 RX ORDER — OMEPRAZOLE 40 MG/1
40 CAPSULE, DELAYED RELEASE ORAL
Qty: 90 CAPSULE | Refills: 1 | Status: SHIPPED | OUTPATIENT
Start: 2025-04-14 | End: 2025-10-11

## 2025-04-14 NOTE — TELEPHONE ENCOUNTER
Patient calling for a refill on the Omeprazole.  Please use the [facility] on file.  Please advise patient.

## 2025-07-14 ENCOUNTER — TELEPHONE (OUTPATIENT)
Dept: OTOLARYNGOLOGY | Facility: HOSPITAL | Age: 85
End: 2025-07-14
Payer: MEDICARE

## 2025-07-14 DIAGNOSIS — K21.9 REFLUX LARYNGITIS: ICD-10-CM

## 2025-07-14 DIAGNOSIS — J04.0 REFLUX LARYNGITIS: ICD-10-CM

## 2025-07-14 RX ORDER — OMEPRAZOLE 40 MG/1
40 CAPSULE, DELAYED RELEASE ORAL
Qty: 90 CAPSULE | Refills: 0 | Status: SHIPPED | OUTPATIENT
Start: 2025-07-14 | End: 2025-10-12

## 2025-07-14 NOTE — TELEPHONE ENCOUNTER
Chart reviewed.   He has an upcoming apt with Dr. Feldman.  A 90 day supply will be sent in and this continued need will be rediscussed at the apt.    I called Mr. Green and left a message noting the above.

## 2025-07-14 NOTE — TELEPHONE ENCOUNTER
Patient needs a refill on the Omeprazole.  Please use the [facility] on file.  Please advise patient.

## 2025-07-30 ENCOUNTER — APPOINTMENT (OUTPATIENT)
Dept: OTOLARYNGOLOGY | Facility: CLINIC | Age: 85
End: 2025-07-30
Payer: MEDICARE

## 2025-08-05 NOTE — PROGRESS NOTES
History of Present Illness    John Green was seen in March 2024 self-referred because he has had a feeling in his throat on the right side.  He has the impression of a lump.  He denied any classic symptoms of gastroesophageal reflux disease.  He sometimes will have some voice changes.  I felt a mass in his left mid neck and sent him for CT scanning which was done in April 2024.  I personally reviewed that scan and could not appreciate any masses.  His CT scan was also negative.  I felt that his symptoms were related to reflux and eventually put him on omeprazole.  He does have a feeling of presence in his throat.  He does have the occasional difficulty in swallowing his pill especially the omeprazole first thing in the morning.    Physical Exam    A flexible laryngoscopy was carried out. Under topical Xylocaine and Diogenes-Synephrine the scope was introduced through the nostril. The nasopharynx, base of tongue, hypopharynx, and larynx are visualized.  The vocal cords are normally mobile. There is no pooling of secretions in the piriform sinuses.  There is minimal vascular hyperplasia of the posterior AE folds.    Assessment and Plan    Improvement in the throat discomfort most likely from reflux.  The patient is to continue the same regimen.  After discussion we agreed to get a modified barium swallow.  He is to call me afterwards.    Left persistent neck mass.  It is an asymmetry.  There is really nothing that was found on scanning.  This was not addressed with him today.    I will see him in 6 months.

## 2025-08-06 ENCOUNTER — APPOINTMENT (OUTPATIENT)
Dept: OTOLARYNGOLOGY | Facility: CLINIC | Age: 85
End: 2025-08-06
Payer: MEDICARE

## 2025-08-06 DIAGNOSIS — K21.9 REFLUX LARYNGITIS: Primary | ICD-10-CM

## 2025-08-06 DIAGNOSIS — J04.0 REFLUX LARYNGITIS: Primary | ICD-10-CM

## 2025-08-06 DIAGNOSIS — R47.02 DYSPHASIA: ICD-10-CM

## 2025-08-06 DIAGNOSIS — R22.1 MASS OF LEFT SIDE OF NECK: ICD-10-CM

## 2025-08-06 PROCEDURE — 99213 OFFICE O/P EST LOW 20 MIN: CPT | Performed by: OTOLARYNGOLOGY

## 2025-08-06 PROCEDURE — 31575 DIAGNOSTIC LARYNGOSCOPY: CPT | Performed by: OTOLARYNGOLOGY

## 2025-08-06 PROCEDURE — 1159F MED LIST DOCD IN RCRD: CPT | Performed by: OTOLARYNGOLOGY

## 2025-08-06 PROCEDURE — 1036F TOBACCO NON-USER: CPT | Performed by: OTOLARYNGOLOGY

## 2025-08-06 PROCEDURE — 1160F RVW MEDS BY RX/DR IN RCRD: CPT | Performed by: OTOLARYNGOLOGY

## 2025-08-06 ASSESSMENT — PATIENT HEALTH QUESTIONNAIRE - PHQ9
SUM OF ALL RESPONSES TO PHQ9 QUESTIONS 1 AND 2: 0
2. FEELING DOWN, DEPRESSED OR HOPELESS: NOT AT ALL
1. LITTLE INTEREST OR PLEASURE IN DOING THINGS: NOT AT ALL

## 2025-08-18 ENCOUNTER — HOSPITAL ENCOUNTER (OUTPATIENT)
Dept: RADIOLOGY | Facility: HOSPITAL | Age: 85
Discharge: HOME | End: 2025-08-18
Payer: MEDICARE

## 2025-08-18 DIAGNOSIS — R47.02 DYSPHASIA: ICD-10-CM

## 2025-08-18 PROCEDURE — 74230 X-RAY XM SWLNG FUNCJ C+: CPT

## 2025-08-18 PROCEDURE — 92611 MOTION FLUOROSCOPY/SWALLOW: CPT | Mod: GN

## 2025-08-18 PROCEDURE — 2500000005 HC RX 250 GENERAL PHARMACY W/O HCPCS: Performed by: OTOLARYNGOLOGY

## 2025-08-18 PROCEDURE — 74220 X-RAY XM ESOPHAGUS 1CNTRST: CPT | Performed by: RADIOLOGY

## 2025-08-18 RX ADMIN — BARIUM SULFATE 150 ML: 0.81 POWDER, FOR SUSPENSION ORAL at 13:55

## 2025-08-18 RX ADMIN — BARIUM SULFATE 70 ML: 400 SUSPENSION ORAL at 13:56

## 2025-08-18 RX ADMIN — BARIUM SULFATE 700 MG: 700 TABLET ORAL at 13:56

## 2025-08-18 RX ADMIN — BARIUM SULFATE 15 ML: 400 PASTE ORAL at 13:56

## 2025-08-19 ENCOUNTER — RESULTS FOLLOW-UP (OUTPATIENT)
Dept: OTOLARYNGOLOGY | Facility: HOSPITAL | Age: 85
End: 2025-08-19
Payer: MEDICARE

## 2025-11-07 ENCOUNTER — APPOINTMENT (OUTPATIENT)
Dept: UROLOGY | Facility: CLINIC | Age: 85
End: 2025-11-07
Payer: MEDICARE

## 2025-11-18 ENCOUNTER — APPOINTMENT (OUTPATIENT)
Dept: OPHTHALMOLOGY | Facility: CLINIC | Age: 85
End: 2025-11-18
Payer: MEDICARE

## 2026-02-25 ENCOUNTER — APPOINTMENT (OUTPATIENT)
Dept: OTOLARYNGOLOGY | Facility: CLINIC | Age: 86
End: 2026-02-25
Payer: MEDICARE